# Patient Record
Sex: MALE | Race: WHITE | Employment: UNEMPLOYED
[De-identification: names, ages, dates, MRNs, and addresses within clinical notes are randomized per-mention and may not be internally consistent; named-entity substitution may affect disease eponyms.]

---

## 2017-02-01 ENCOUNTER — OFFICE VISIT (OUTPATIENT)
Dept: NEUROLOGY | Facility: CLINIC | Age: 62
End: 2017-02-01

## 2017-02-01 VITALS — HEIGHT: 70 IN | SYSTOLIC BLOOD PRESSURE: 118 MMHG | HEART RATE: 60 BPM | DIASTOLIC BLOOD PRESSURE: 80 MMHG

## 2017-02-01 DIAGNOSIS — G81.91 RIGHT HEMIPLEGIA (HCC): ICD-10-CM

## 2017-02-01 DIAGNOSIS — I48.0 PAROXYSMAL ATRIAL FIBRILLATION (HCC): ICD-10-CM

## 2017-02-01 DIAGNOSIS — I63.9 CEREBRAL INFARCTION, LEFT HEMISPHERE (HCC): Primary | ICD-10-CM

## 2017-02-01 PROCEDURE — 99214 OFFICE O/P EST MOD 30 MIN: CPT | Performed by: PSYCHIATRY & NEUROLOGY

## 2017-02-01 RX ORDER — MELATONIN
DAILY
COMMUNITY

## 2017-02-01 RX ORDER — ATORVASTATIN CALCIUM 10 MG/1
10 TABLET, FILM COATED ORAL DAILY
COMMUNITY

## 2017-05-01 ENCOUNTER — OFFICE VISIT (OUTPATIENT)
Dept: NEUROLOGY | Age: 62
End: 2017-05-01
Payer: MEDICAID

## 2017-05-01 VITALS
SYSTOLIC BLOOD PRESSURE: 146 MMHG | WEIGHT: 247 LBS | HEART RATE: 60 BPM | BODY MASS INDEX: 33.46 KG/M2 | DIASTOLIC BLOOD PRESSURE: 96 MMHG | HEIGHT: 72 IN

## 2017-05-01 DIAGNOSIS — I63.9 CEREBRAL INFARCTION, LEFT HEMISPHERE (HCC): Primary | ICD-10-CM

## 2017-05-01 DIAGNOSIS — I48.0 PAROXYSMAL ATRIAL FIBRILLATION (HCC): ICD-10-CM

## 2017-05-01 DIAGNOSIS — G81.91 RIGHT HEMIPLEGIA (HCC): ICD-10-CM

## 2017-05-01 PROCEDURE — 99214 OFFICE O/P EST MOD 30 MIN: CPT | Performed by: PSYCHIATRY & NEUROLOGY

## 2017-05-01 RX ORDER — FUROSEMIDE 40 MG/1
40 TABLET ORAL DAILY
COMMUNITY

## 2017-09-29 DIAGNOSIS — M25.561 RIGHT KNEE PAIN, UNSPECIFIED CHRONICITY: Primary | ICD-10-CM

## 2017-10-02 ENCOUNTER — OFFICE VISIT (OUTPATIENT)
Dept: ORTHOPEDIC SURGERY | Age: 62
End: 2017-10-02
Payer: MEDICAID

## 2017-10-02 DIAGNOSIS — G81.01 FLACCID HEMIPLEGIA OF RIGHT DOMINANT SIDE DUE TO INFARCTION OF BRAIN (HCC): Primary | ICD-10-CM

## 2017-10-02 DIAGNOSIS — M17.11 PRIMARY OSTEOARTHRITIS OF RIGHT KNEE: ICD-10-CM

## 2017-10-02 DIAGNOSIS — I63.9 FLACCID HEMIPLEGIA OF RIGHT DOMINANT SIDE DUE TO INFARCTION OF BRAIN (HCC): Primary | ICD-10-CM

## 2017-10-02 PROCEDURE — 99204 OFFICE O/P NEW MOD 45 MIN: CPT | Performed by: STUDENT IN AN ORGANIZED HEALTH CARE EDUCATION/TRAINING PROGRAM

## 2017-10-02 NOTE — PROGRESS NOTES
TABS tablet Take 1 tablet by mouth daily Prescribed as per [place] cardiology team, for A fib      divalproex (DEPAKOTE SPRINKLE) 125 MG capsule Take 4 capsules by mouth every 12 hours  0    lamoTRIgine (LAMICTAL) 25 MG tablet Take 2 tablets by mouth daily As per neurology for seizure prevention  0    lisinopril (PRINIVIL;ZESTRIL) 20 MG tablet Take 1 tablet by mouth daily  0    carvedilol (COREG) 25 MG tablet Take 1 tablet by mouth 2 times daily (with meals)  0    docusate sodium (COLACE, DULCOLAX) 100 MG CAPS Take 100 mg by mouth 2 times daily as needed for Constipation      ARIPiprazole (ABILIFY) 20 MG tablet Take 20 mg by mouth daily      acetaminophen (TYLENOL) 325 MG tablet Take 650 mg by mouth every 6 hours as needed for Pain      bisacodyl (DULCOLAX) 10 MG suppository Place 10 mg rectally daily as needed for Constipation      metFORMIN (GLUCOPHAGE) 500 MG tablet Take 1 tablet by mouth 2 times daily (with meals) 60 tablet 0     No current facility-administered medications for this visit. Allergies:    Latex; Benzodiazepines; Haldol decanoate [haloperidol decanoate]; Other; and Penicillins    Social History:   Social History     Social History    Marital status: Single     Spouse name: N/A    Number of children: N/A    Years of education: N/A     Occupational History    Not on file.      Social History Main Topics    Smoking status: Current Every Day Smoker     Packs/day: 0.25     Types: Cigarettes    Smokeless tobacco: Never Used    Alcohol use No      Comment: past use only    Drug use: No    Sexual activity: Not on file     Other Topics Concern    Not on file     Social History Narrative       Family History:  Family History   Problem Relation Age of Onset    Diabetes Mother     Alcohol Abuse Mother      Many family members    Dementia Mother     Diabetes Father     Cancer Father      Colon    Depression Other      Many family members         REVIEW OF SYSTEMS:  Review of Systems  Constitutional: Negative for fever and chills. HENT: Negative for congestion and eye pain. Eyes: Negative for blurred vision and double vision. Respiratory: Negative for cough, shortness of breath and wheezing. Cardiovascular: Negative for chest pain and palpitations. Gastrointestinal: Negative for nausea. Negative for vomiting. Musculoskeletal: Positive for myalgias and joint pain. Skin: Negative for itching and rash. Neurological: Negative for dizziness, sensory change and headaches. Psychiatric/Behavioral: Negative for depression and suicidal ideas. PHYSICAL EXAM:  There were no vitals taken for this visit. Physical Exam  Gen: alert and oriented  Psych:  Appropriate affect; Appropriate knowledge base; Appropriate mood; No hallucinations  Head: normocephalic atraumatic   Chest: symmetric chest excursion  Ortho Exam  RLE: Global weakness to the right leg, 3 out of 5 hip flexion and knee flexion and extension. 4 out of 5 ankle dorsi and plantar flexion. Positive clonus with sustained clonus. Sensation intact in the L4 to S1 dermatomes. Positive Presley's to the upper extremity. Sensation intact from C5 to T1 dermatomes. Ulnar/Median/AIN/PIN motor intact 5/5    Radiology:   History:   61M R knee pain, leg weakness    Findings:   Views: 4 views R knee   Weight bearing: No   Findings: 4 views of the right knee taken in the office today demonstrate moderate tricompartmental osteoarthritis. Slight varus angulation noted although the images were not weightbearing patient is unable to perform. No acute osseous abnormalities or fractures. Diffuse disuse osteopenia noted to the distal femur and proximal tibia. Previous comparison films: None    Impression:  1) moderate tricompartmental right knee DJD  2) diffuse disuse osteopenia in the femur and tibia. ASSESSMENT:     1. Flaccid hemiplegia of right dominant side due to infarction of brain ([address]    2.  Primary osteoarthritis of right knee         PLAN:       - We discussed with the patient that most of his symptomatology and weaknesses likely related to his stroke. He does have moderate amounts of tricompartmental degenerative joint disease in the right knee, however at this time pain is not much of an issue for him. We will continue forward with conservative therapy. - An order given for [facility] for aggressive physical therapy for range of motion and strengthening as possible to the right lower extremity.  - Over-the-counter pain medications for pain control to the knee as needed that do not contraindicate with his other medications. Management for his primary team.  - Follow-up with us as needed. Return if symptoms worsen or fail to improve. No orders of the defined types were placed in this encounter. No orders of the defined types were placed in this encounter.        Electronically signed by Trever Mccullough DO on 10/2/2017 at 4:03 PM      (Please note that portions of this note were completed with a voice recognition program. Efforts were made to edit the dictations but occasionally words are mis-transcribed.)

## 2017-11-09 ENCOUNTER — TELEPHONE (OUTPATIENT)
Dept: NEUROLOGY | Age: 62
End: 2017-11-09

## 2017-11-09 ENCOUNTER — OFFICE VISIT (OUTPATIENT)
Dept: NEUROLOGY | Age: 62
End: 2017-11-09
Payer: MEDICAID

## 2017-11-09 VITALS — HEIGHT: 71 IN | HEART RATE: 57 BPM | SYSTOLIC BLOOD PRESSURE: 91 MMHG | DIASTOLIC BLOOD PRESSURE: 63 MMHG

## 2017-11-09 DIAGNOSIS — R56.9 SEIZURE (HCC): Primary | ICD-10-CM

## 2017-11-09 DIAGNOSIS — I63.9 CEREBRAL INFARCTION, LEFT HEMISPHERE (HCC): ICD-10-CM

## 2017-11-09 PROCEDURE — 99214 OFFICE O/P EST MOD 30 MIN: CPT | Performed by: NURSE PRACTITIONER

## 2017-11-09 RX ORDER — TRAMADOL HYDROCHLORIDE 50 MG/1
50 TABLET ORAL EVERY 6 HOURS PRN
COMMUNITY
End: 2018-01-09 | Stop reason: ALTCHOICE

## 2017-11-09 RX ORDER — BACLOFEN 10 MG/1
10 TABLET ORAL 2 TIMES DAILY
Qty: 60 TABLET | Refills: 3 | Status: SHIPPED | OUTPATIENT
Start: 2017-11-09

## 2017-11-09 NOTE — PROGRESS NOTES
Diagnosis Date    Abnormal liver enzymes     Alcohol abuse     [facility]     Coronary disease     Diabetes mellitus ([facility]     Hyperlipidemia     Hypertension     Pacemaker     Renal injury     Schizoaffective disorder ([facility]     Smoker          Past Surgical History:   Procedure Laterality Date    CARDIAC PACEMAKER PLACEMENT         Family History   Problem Relation Age of Onset    Diabetes Mother     Alcohol Abuse Mother      Many family members    Dementia Mother     Diabetes Father     Cancer Father      Colon    Depression Other      Many family members       Social History   Substance Use Topics    Smoking status: Current Every Day Smoker     Packs/day: 0.25     Types: Cigarettes    Smokeless tobacco: Never Used    Alcohol use No      Comment: past use only                               REVIEW OF SYSTEMS    CONSTITUTIONAL Weight: present, Appetite: present, Fatigue: absent      HEENT Ears: normal, Visual disturbance: absent   RESPIRATORY Shortness of breath: absent, Cough: absent   CARDIOVASCULAR Chest pain: absent, Leg swelling :present      GI Constipation: absent, Diarrhea: absent, Swallowing change: absent       Urinary frequency: absent, Urinary urgency: absent, Urinary incontinence: absent   MUSCULOSKELETAL Neck pain: absent, Back pain: absent, Stiffness: absent, Muscle pain: absent, Joint pain: absent Restless legs: absent   DERMATOLOGIC Hair loss: absent, Skin changes: absent   NEUROLOGIC Memory loss: absent, Confusion: absent, Seizures: absent Trouble walking or imbalance: present, Dizziness: absent, Weakness: absent, Numbness: absent Tremor: absent, Spasm: absent, Speech difficulty: absent, Headache: absent, Light sensitivity: absent   PSYCHIATRIC Anxiety: present, Hallucination: absent, Mood disorder: absent   HEMATOLOGIC Abnormal bleeding: absent, Anemia: absent, Clotting disorder: absent, Lymph gland changes: absent           Allergies   Allergen Reactions    Latex     Benzodiazepines     Haldol Decanoate [Haloperidol Decanoate]     Other      Natural rubber    Penicillins            Current Outpatient Prescriptions   Medication Sig Dispense Refill    furosemide (LASIX) 20 MG tablet Take 20 mg by mouth daily      Multiple Vitamins-Minerals (MULTIVITAMIN PO) Take by mouth daily      atorvastatin (LIPITOR) 10 MG tablet Take 10 mg by mouth daily      Cholecalciferol (VITAMIN D3) 1000 UNITS TABS Take by mouth daily      rivaroxaban (XARELTO) 20 MG TABS tablet Take 1 tablet by mouth daily Prescribed as per [place] cardiology team, for A fib      divalproex (DEPAKOTE SPRINKLE) 125 MG capsule Take 4 capsules by mouth every 12 hours  0    lamoTRIgine (LAMICTAL) 25 MG tablet Take 2 tablets by mouth daily As per neurology for seizure prevention  0    lisinopril (PRINIVIL;ZESTRIL) 20 MG tablet Take 1 tablet by mouth daily  0    carvedilol (COREG) 25 MG tablet Take 1 tablet by mouth 2 times daily (with meals)  0    docusate sodium (COLACE, DULCOLAX) 100 MG CAPS Take 100 mg by mouth 2 times daily as needed for Constipation      ARIPiprazole (ABILIFY) 20 MG tablet Take 20 mg by mouth daily      acetaminophen (TYLENOL) 325 MG tablet Take 650 mg by mouth every 6 hours as needed for Pain      bisacodyl (DULCOLAX) 10 MG suppository Place 10 mg rectally daily as needed for Constipation      metFORMIN (GLUCOPHAGE) 500 MG tablet Take 1 tablet by mouth 2 times daily (with meals) 60 tablet 0     No current facility-administered medications for this visit. PHYSICAL EXAMINATION       There were no vitals filed for this visit.                                            .                                                                                                    General Appearance:  Alert, cooperative, no signs of distress, appears stated age   Head:  Normocephalic, no signs of trauma   Eyes:  Conjunctiva/corneas clear; eyelids intact   Ears:  Normal external ear and canals   Nose: Nares normal, mucosa normal, no drainage    Throat: Lips and tongue normal; teeth normal;  gums normal   Neck: Supple, intact flexion, extension and rotation;   trachea midline;  no adenopathy;   thyroid: not enlarged;   no carotid pulse abnormality   Back:   Symmetric, no curvature, ROM adequate   Lungs:   Respirations unlabored   Heart:  Regular rate and rhythm           Extremities: Extremities normal, no cyanosis, no edema   Pulses: Symmetric over head and neck   Skin: Skin color, texture normal, no rashes, no lesions                                             NEUROLOGIC EXAMINATION    Neurologic Exam     Mental Status   Oriented to person, place, and time. Attention: normal.   Speech: speech is normal   Level of consciousness: alert  Normal comprehension. Cranial Nerves     CN II   Visual fields full to confrontation. CN III, IV, VI   Pupils are equal, round, and reactive to light. Extraocular motions are normal.     CN V   Facial sensation intact. CN VII   Facial expression full, symmetric. CN VIII   CN VIII normal.     CN IX, X   CN IX normal.     CN XI   CN XI normal.     CN XII   CN XII normal.     Motor Exam   Muscle bulk: normal  Overall muscle tone: normal  Right arm tone: increased  Left arm tone: normal  Right arm pronator drift: absent  Right leg tone: spastic  Left leg tone: normal    Strength   Strength 5/5 except as noted.    Right biceps: 4/5  Right triceps: 4/5  Right interossei: 4/5  Right iliopsoas: 0/5  Right quadriceps: 0/5  Right anterior tibial: 0/5  Right gastroc: 0/5    Sensory Exam   Light touch normal.   Vibration normal.   Pinprick normal.     Gait, Coordination, and Reflexes     Coordination   Finger to nose coordination: normal    Tremor   Resting tremor: absent    Reflexes   Right brachioradialis: 2+  Left brachioradialis: 2+  Right biceps: 2+  Left biceps: 2+  Right triceps: 2+  Left triceps: 2+  Right patellar: 2+  Left patellar: 2+  Right achilles: 2+  Left achilles: 2+  Right plantar: upgoing  Left plantar: normal  Right ankle clonus: present  Left ankle clonus: absent            ASSESSMENT/PLAN:       In summary, your patient, Clark Oneill exhibits the following, with associated plan:    1. Seizure disorder, diagnosed based on EEG findings  1. Obtain an EEG  2. Based on EEG findings, we may be able to discontinue Lamictal.  Patient is also treated with Depakote 500 mg twice a day for schizoaffective disorder. 2. Previous left hemispheric stroke in 2016  1. Continue Xarelto and atorvastatin for stroke prevention  3. Right spastic hemiparesis  1. Will add baclofen 10 mg at bedtime daily for 1 week then 10 mg twice daily.   2. Patient will return for reevaluation in 2 months            Signed: Brenda Paredes, CNP

## 2017-11-09 NOTE — LETTER
November 9, 2017     Joan Yoon MD  [address]    Patient: Jeniffer Betancourt  MR Number: [id number]  YOB: 1955  Date of Visit: 11/9/2017    Dear Dr. Willow Penaloza Iwuagwu:        [facility]  [facility], [address] [place] [address], [address] [facility]  Dept: [phone]  Dept Fax: [phone]                              MD Jovanny Alcocer, MD Barrington Garner, MD Catherine Duke, MD Benjamin Johnson MD    11/9/2017    HPI:      Your patient, Jeniffer Betancourt returns for continuing neurologic. Patient is a 59-year-old man seen in the past by  for a left hemispheric infarction with resultant Rt hemiplegia, which occurred in [address] [place] [address]. He was last seen by Dr. Gladys Palma on 5/1/17. He continues on  aspirin, Xarelto and atorvastatin for stroke prevention. Today, patient reports that when he is trying to work with physical therapy, his right leg begins to shake. He reports no pain of his right arm or leg. He also had an episode of acute encephalopathy with an abnormal EEG and has been on Lamictal without any witnessed seizure activity since his last visit in May. The plan was to repeat the EEG in 6 months and follow up afterwards; until then, he is continued on Lamictal at 50 mg daily. He is on Depakote 500 mg BID and Abilify for schizoaffective disorder. Paroxysmal atrial fibrillation: on xarelto. CT head 12/21/16: No hemorrhage or mass.   Underlying atrophy with this severe periventricular frontal parietal white matter disease, likely due to small vessel ischemic change.     CTA head and neck 12/22/16:  Intracranial atherosclerotic disease with segmental areas of narrowing involving the middle and anterior as well as absent, Headache: absent, Light sensitivity: absent   PSYCHIATRIC Anxiety: present, Hallucination: absent, Mood disorder: absent   HEMATOLOGIC Abnormal bleeding: absent, Anemia: absent, Clotting disorder: absent, Lymph gland changes: absent           Allergies   Allergen Reactions    Latex     Benzodiazepines     Haldol Decanoate [Haloperidol Decanoate]     Other      Natural rubber    Penicillins            Current Outpatient Prescriptions   Medication Sig Dispense Refill    furosemide (LASIX) 20 MG tablet Take 20 mg by mouth daily      Multiple Vitamins-Minerals (MULTIVITAMIN PO) Take by mouth daily      atorvastatin (LIPITOR) 10 MG tablet Take 10 mg by mouth daily      Cholecalciferol (VITAMIN D3) 1000 UNITS TABS Take by mouth daily      rivaroxaban (XARELTO) 20 MG TABS tablet Take 1 tablet by mouth daily Prescribed as per [place] cardiology team, for A fib      divalproex (DEPAKOTE SPRINKLE) 125 MG capsule Take 4 capsules by mouth every 12 hours  0    lamoTRIgine (LAMICTAL) 25 MG tablet Take 2 tablets by mouth daily As per neurology for seizure prevention  0    lisinopril (PRINIVIL;ZESTRIL) 20 MG tablet Take 1 tablet by mouth daily  0    carvedilol (COREG) 25 MG tablet Take 1 tablet by mouth 2 times daily (with meals)  0    docusate sodium (COLACE, DULCOLAX) 100 MG CAPS Take 100 mg by mouth 2 times daily as needed for Constipation      ARIPiprazole (ABILIFY) 20 MG tablet Take 20 mg by mouth daily      acetaminophen (TYLENOL) 325 MG tablet Take 650 mg by mouth every 6 hours as needed for Pain      bisacodyl (DULCOLAX) 10 MG suppository Place 10 mg rectally daily as needed for Constipation      metFORMIN (GLUCOPHAGE) 500 MG tablet Take 1 tablet by mouth 2 times daily (with meals) 60 tablet 0     No current facility-administered medications for this visit. PHYSICAL EXAMINATION       There were no vitals filed for this visit. Vibration normal.   Pinprick normal.     Gait, Coordination, and Reflexes     Coordination   Finger to nose coordination: normal    Tremor   Resting tremor: absent    Reflexes   Right brachioradialis: 2+  Left brachioradialis: 2+  Right biceps: 2+  Left biceps: 2+  Right triceps: 2+  Left triceps: 2+  Right patellar: 2+  Left patellar: 2+  Right achilles: 2+  Left achilles: 2+  Right plantar: upgoing  Left plantar: normal  Right ankle clonus: present  Left ankle clonus: absent            ASSESSMENT/PLAN:       In summary, your patient, Raj Chan exhibits the following, with associated plan:    1. Seizure disorder, diagnosed based on EEG findings  1. Obtain an EEG  2. Based on EEG findings, we may be able to discontinue Lamictal.  Patient is also treated with Depakote 500 mg twice a day for schizoaffective disorder. 2. Previous left hemispheric stroke in 2016  1. Continue Xarelto and atorvastatin for stroke prevention  3. Right spastic hemiparesis  1. Will add baclofen 10 mg at bedtime daily for 1 week then 10 mg twice daily. 2. Patient will return for reevaluation in 2 months            Signed: Lacie Leggett CNP           If you have questions, please do not hesitate to call me. I look forward to following Sukhi Barcenas along with you.     Sincerely,        Sima Osborn CNP

## 2018-01-09 ENCOUNTER — OFFICE VISIT (OUTPATIENT)
Dept: NEUROLOGY | Age: 63
End: 2018-01-09
Payer: MEDICAID

## 2018-01-09 ENCOUNTER — TELEPHONE (OUTPATIENT)
Dept: NEUROLOGY | Age: 63
End: 2018-01-09

## 2018-01-09 VITALS — SYSTOLIC BLOOD PRESSURE: 92 MMHG | HEART RATE: 64 BPM | DIASTOLIC BLOOD PRESSURE: 70 MMHG

## 2018-01-09 DIAGNOSIS — R56.9 SEIZURE (HCC): ICD-10-CM

## 2018-01-09 DIAGNOSIS — I63.9 CEREBRAL INFARCTION, LEFT HEMISPHERE (HCC): Primary | ICD-10-CM

## 2018-01-09 DIAGNOSIS — G81.91 RIGHT HEMIPLEGIA (HCC): ICD-10-CM

## 2018-01-09 PROCEDURE — 99214 OFFICE O/P EST MOD 30 MIN: CPT | Performed by: NURSE PRACTITIONER

## 2018-01-09 RX ORDER — LANOLIN ALCOHOL/MO/W.PET/CERES
3 CREAM (GRAM) TOPICAL DAILY
COMMUNITY

## 2018-01-09 RX ORDER — AMMONIUM LACTATE 12 G/100G
LOTION TOPICAL PRN
COMMUNITY

## 2018-01-09 NOTE — LETTER
Urinary frequency: absent, Urinary urgency: present, Urinary incontinence: absent   MUSCULOSKELETAL Neck pain: absent, Back pain: absent, Stiffness: absent, Muscle pain: absent, Joint pain: present Restless legs: absent   DERMATOLOGIC Hair loss: present, Skin changes: absent   NEUROLOGIC Memory loss: present, Confusion: absent, Seizures: absent Trouble walking or imbalance: present, Dizziness: absent, Weakness: absent, Numbness: absent Tremor: absent, Spasm: absent, Speech difficulty: absent, Headache: absent, Light sensitivity: absent   PSYCHIATRIC Anxiety: absent, Hallucination: absent, Mood disorder: present   HEMATOLOGIC Abnormal bleeding: absent, Anemia: absent, Clotting disorder: absent, Lymph gland changes: absent           Allergies   Allergen Reactions    Latex     Benzodiazepines     Haldol Decanoate [Haloperidol Decanoate]     Other      Natural rubber    Penicillins            Current Outpatient Prescriptions   Medication Sig Dispense Refill    ammonium lactate (LAC-HYDRIN) 12 % lotion Apply topically as needed for Dry Skin Apply topically as needed.       melatonin 3 MG TABS tablet Take 3 mg by mouth daily      magnesium hydroxide (MILK OF MAGNESIA) 400 MG/5ML suspension Take by mouth daily as needed for Constipation      baclofen (LIORESAL) 10 MG tablet Take 1 tablet by mouth 2 times daily Start one at [facility] for one week ,then twice daily 60 tablet 3    furosemide (LASIX) 40 MG tablet Take 40 mg by mouth daily       Multiple Vitamins-Minerals (MULTIVITAMIN PO) Take by mouth daily      atorvastatin (LIPITOR) 10 MG tablet Take 10 mg by mouth daily      Cholecalciferol (VITAMIN D3) 1000 UNITS TABS Take by mouth daily      rivaroxaban (XARELTO) 20 MG TABS tablet Take 1 tablet by mouth daily Prescribed as per [place] cardiology team, for A fib      divalproex (DEPAKOTE SPRINKLE) 125 MG capsule Take 4 capsules by mouth every 12 hours  0

## 2018-01-09 NOTE — PROGRESS NOTES
[facility]  [facility], [address] [place] [address], [address] [place] [address]  Dept: [phone]  Dept Fax: [phone]                            MD Elle Quiroga MD Seleta Fare, MD Ahmed B. Branda Cowing, MD Vicie East, MD Almetta Ivan, CNP      1/9/2018    HPI:      Your patient, Hayley Joyce returns for continuing neurologic care. Patient is a 58-year-old man who was seen last on November 9, 2017. He was previously seen by Dr. 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 The stroke occurred in December 2016 and he continues on aspirin 81 mg, Xarelto, and atorvastatin for secondary stroke prevention. At his last visit, he was complaining of shaking in his right arm and leg and was placed on a titrating dose of baclofen, beginning with 10 mg daily and titrating to 10 mg twice daily. Patient also had an episode of acute encephalopathy during his hospitalization in December 2016 with an abnormal EEG. He was placed on multiple Lamictal 50 mg daily. He is on Depakote 500 mg twice daily and Abilify for schizoaffective disorder. Patient also has a history of atrial fibrillation, for which he is on Xarelto. At the patients last visit he was to have a repeat EEG, and if this was negative, we would wean him from his Lamictal.  There is no evidence, however, that the patient had an EEG completed. Previous testing included CT scan of the brain in December 2016 showing underlying atrophy and severe periventricular frontal parietal white matter disease. CT angiography of the head and neck showed no evidence for intracranial large vessel occlusion there was intracranial atherosclerotic disease with segmental narrowing involving the middle and anterior, as well as vertebrobasilar circulation.   An MRI was Hair loss: present, Skin changes: absent   NEUROLOGIC Memory loss: present, Confusion: absent, Seizures: absent Trouble walking or imbalance: present, Dizziness: absent, Weakness: absent, Numbness: absent Tremor: absent, Spasm: absent, Speech difficulty: absent, Headache: absent, Light sensitivity: absent   PSYCHIATRIC Anxiety: absent, Hallucination: absent, Mood disorder: present   HEMATOLOGIC Abnormal bleeding: absent, Anemia: absent, Clotting disorder: absent, Lymph gland changes: absent           Allergies   Allergen Reactions    Latex     Benzodiazepines     Haldol Decanoate [Haloperidol Decanoate]     Other      Natural rubber    Penicillins            Current Outpatient Prescriptions   Medication Sig Dispense Refill    ammonium lactate (LAC-HYDRIN) 12 % lotion Apply topically as needed for Dry Skin Apply topically as needed.       melatonin 3 MG TABS tablet Take 3 mg by mouth daily      magnesium hydroxide (MILK OF MAGNESIA) 400 MG/5ML suspension Take by mouth daily as needed for Constipation      baclofen (LIORESAL) 10 MG tablet Take 1 tablet by mouth 2 times daily Start one at [facility] for one week ,then twice daily 60 tablet 3    furosemide (LASIX) 40 MG tablet Take 40 mg by mouth daily       Multiple Vitamins-Minerals (MULTIVITAMIN PO) Take by mouth daily      atorvastatin (LIPITOR) 10 MG tablet Take 10 mg by mouth daily      Cholecalciferol (VITAMIN D3) 1000 UNITS TABS Take by mouth daily      rivaroxaban (XARELTO) 20 MG TABS tablet Take 1 tablet by mouth daily Prescribed as per [facility] cardiology team, for A fib      divalproex (DEPAKOTE SPRINKLE) 125 MG capsule Take 4 capsules by mouth every 12 hours  0    lamoTRIgine (LAMICTAL) 25 MG tablet Take 2 tablets by mouth daily As per neurology for seizure prevention  0    lisinopril (PRINIVIL;ZESTRIL) 20 MG tablet Take 1 tablet by mouth daily  0    carvedilol (COREG) 25 MG tablet Take 1 tablet by mouth 2 times daily (with meals)  0    docusate sodium (COLACE, DULCOLAX) 100 MG CAPS Take 100 mg by mouth 2 times daily as needed for Constipation      ARIPiprazole (ABILIFY) 5 MG tablet Take 5 mg by mouth daily       acetaminophen (TYLENOL) 325 MG tablet Take 650 mg by mouth every 6 hours as needed for Pain      bisacodyl (DULCOLAX) 10 MG suppository Place 10 mg rectally daily as needed for Constipation      metFORMIN (GLUCOPHAGE) 500 MG tablet Take 1 tablet by mouth 2 times daily (with meals) 60 tablet 0     No current facility-administered medications for this visit. PHYSICAL EXAMINATION       There were no vitals filed for this visit. .                                                                                                    General Appearance:  Alert, cooperative, no signs of distress, appears stated age   Head:  Normocephalic, no signs of trauma   Eyes:  Conjunctiva/corneas clear;  eyelids intact   Ears:  Normal external ear and canals   Nose: Nares normal, mucosa normal, no drainage    Throat: Lips and tongue normal; teeth normal;  gums normal   Neck: Supple, intact flexion, extension and rotation;   trachea midline;  no adenopathy;   thyroid: not enlarged;   no carotid pulse abnormality   Back:   Symmetric, no curvature, ROM adequate   Lungs:   Respirations unlabored   Heart:  Regular rate and rhythm           Extremities: Extremities normal, no cyanosis, no edema   Pulses: Symmetric over head and neck   Skin: Skin color, texture normal, no rashes, no lesions                                             NEUROLOGIC EXAMINATION    Neurologic Exam     Mental Status   Oriented to person, place, and time. Oriented to person. Oriented to place. Oriented to year. Attention: normal.   Speech: speech is normal   Level of consciousness: alert  Able to name object. Able to repeat. Normal comprehension.      Cranial Nerves     CN II   Visual fields full to confrontation. CN III, IV, VI   Pupils are equal, round, and reactive to light. Extraocular motions are normal.     CN V   Facial sensation intact. CN VII   Facial expression full, symmetric. CN VIII   CN VIII normal.     CN IX, X   CN IX normal.     CN XI   CN XI normal.     CN XII   CN XII normal.     Motor Exam   Muscle bulk: normal  Overall muscle tone: normal  Right arm tone: normal  Left arm tone: normal  Right arm pronator drift: absent  Left arm pronator drift: absent  Right leg tone: normal  Left leg tone: normal    Strength   Strength 5/5 except as noted. Right biceps: 3/5  Right triceps: 3/5  Right wrist flexion: 3/5  Right wrist extension: 3/5  Right quadriceps: 4/5  Right hamstrin/5  Right glutei: 4/5    Sensory Exam   Light touch normal.   Vibration normal.   Pinprick normal.     Gait, Coordination, and Reflexes     Gait  Gait: normal    Coordination   Finger to nose coordination: normal    Tremor   Resting tremor: absent    Reflexes   Right brachioradialis: 2+  Left brachioradialis: 2+  Right biceps: 2+  Left biceps: 2+  Right triceps: 2+  Left triceps: 2+  Right patellar: 3+  Left patellar: 2+  Right achilles: 3+  Left achilles: 2+  Right plantar: normal  Left plantar: normal  Right ankle clonus: absent            ASSESSMENT/PLAN:       In summary, your patient, Ervin Reynoso exhibits the following, with associated plan:    1. Seizure disorder, diagnosed based on EEG findings  1. EEG ordered at last visit, we ordered today. 2. Based on EEG findings, we may discontinue Lamictal.  Patient is treated with Depakote 500 mg twice daily for schizoaffective disorder  2. Previous left hemispheric stroke in 2016  1. Continue Xarelto and atorvastatin for secondary stroke prevention  3. Right spastic hemiparesis. 1. Continue baclofen 10 mg twice daily.   Patient was evaluated for possible titration of his dosage, however there is no sustained clonus on exam and patient states that the spasms

## 2018-03-27 DIAGNOSIS — R56.9 SEIZURE (HCC): ICD-10-CM

## 2018-04-16 ENCOUNTER — OFFICE VISIT (OUTPATIENT)
Dept: NEUROLOGY | Age: 63
End: 2018-04-16
Payer: MEDICAID

## 2018-04-16 VITALS — SYSTOLIC BLOOD PRESSURE: 96 MMHG | DIASTOLIC BLOOD PRESSURE: 65 MMHG | HEART RATE: 60 BPM

## 2018-04-16 DIAGNOSIS — R94.01 ABNORMAL EEG: Primary | ICD-10-CM

## 2018-04-16 DIAGNOSIS — I63.9 CEREBRAL INFARCTION, LEFT HEMISPHERE (HCC): ICD-10-CM

## 2018-04-16 DIAGNOSIS — G81.10 SPASTIC HEMIPARESIS AFFECTING DOMINANT SIDE (HCC): ICD-10-CM

## 2018-04-16 PROCEDURE — 99214 OFFICE O/P EST MOD 30 MIN: CPT | Performed by: NURSE PRACTITIONER
